# Patient Record
Sex: FEMALE
[De-identification: names, ages, dates, MRNs, and addresses within clinical notes are randomized per-mention and may not be internally consistent; named-entity substitution may affect disease eponyms.]

---
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
## 2020-07-17 ENCOUNTER — NON-APPOINTMENT (OUTPATIENT)
Age: 20
End: 2020-07-17

## 2020-07-17 ENCOUNTER — APPOINTMENT (OUTPATIENT)
Dept: OPHTHALMOLOGY | Facility: CLINIC | Age: 20
End: 2020-07-17
Payer: COMMERCIAL

## 2020-07-17 PROCEDURE — 92004 COMPRE OPH EXAM NEW PT 1/>: CPT

## 2020-07-17 PROCEDURE — 92015 DETERMINE REFRACTIVE STATE: CPT

## 2021-09-26 ENCOUNTER — TRANSCRIPTION ENCOUNTER (OUTPATIENT)
Age: 21
End: 2021-09-26

## 2023-12-12 ENCOUNTER — APPOINTMENT (OUTPATIENT)
Dept: ORTHOPEDIC SURGERY | Facility: CLINIC | Age: 23
End: 2023-12-12
Payer: COMMERCIAL

## 2023-12-12 VITALS — BODY MASS INDEX: 35.08 KG/M2 | HEIGHT: 63 IN | WEIGHT: 198 LBS

## 2023-12-12 DIAGNOSIS — Z78.9 OTHER SPECIFIED HEALTH STATUS: ICD-10-CM

## 2023-12-12 DIAGNOSIS — Z00.00 ENCOUNTER FOR GENERAL ADULT MEDICAL EXAMINATION W/OUT ABNORMAL FINDINGS: ICD-10-CM

## 2023-12-12 PROCEDURE — 73564 X-RAY EXAM KNEE 4 OR MORE: CPT | Mod: LT

## 2023-12-12 PROCEDURE — 99204 OFFICE O/P NEW MOD 45 MIN: CPT

## 2023-12-12 RX ORDER — DICLOFENAC SODIUM 1% 10 MG/G
1 GEL TOPICAL DAILY
Qty: 1 | Refills: 1 | Status: ACTIVE | COMMUNITY
Start: 2023-12-12 | End: 1900-01-01

## 2023-12-13 NOTE — DISCUSSION/SUMMARY
[de-identified] : 23f with left knee patellar tendinitis.  1) note to freeze gym membership 2) start physical therapy 3) cryotherapy, rest and activity modification 4) diclofenac gel rx 5) rtc 4 weeks   Entered by Yelena Castillo acting as scribe. Dr. Koroma- The documentation recorded by the scribe accurately reflects the service I personally performed and the decisions made by me.

## 2023-12-13 NOTE — HISTORY OF PRESENT ILLNESS
[de-identified] : 12/12/23: Ms Elinor Zuniga is a 22 y/o RHD F here c/o 2 weeks of non-traumatic left knee pain. She suspects an overuse related  to exercises and orange theory. Describes her pain as being located over the anterior aspect. Has been modifying her activity and exercise. Has tried icing and rest. No prior treatment.  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

## 2023-12-13 NOTE — PHYSICAL EXAM
[] : non-antalgic [de-identified] : pain with resisted SLR [TWNoteComboBox7] : flexion 125 degrees

## 2024-01-17 ENCOUNTER — APPOINTMENT (OUTPATIENT)
Dept: ORTHOPEDIC SURGERY | Facility: CLINIC | Age: 24
End: 2024-01-17
Payer: COMMERCIAL

## 2024-01-17 VITALS — WEIGHT: 198 LBS | BODY MASS INDEX: 35.08 KG/M2 | HEIGHT: 63 IN

## 2024-01-17 PROCEDURE — 99213 OFFICE O/P EST LOW 20 MIN: CPT

## 2024-01-17 NOTE — PHYSICAL EXAM
[Left] : left knee [NL (0)] : extension 0 degrees [4___] : quadriceps 4[unfilled]/5 [Negative] : negative Kay's [] : non-antalgic [FreeTextEntry8] : mild ttp  [de-identified] : pain with resisted SLR [TWNoteComboBox7] : flexion 125 degrees

## 2024-01-17 NOTE — HISTORY OF PRESENT ILLNESS
[de-identified] : 1/17/24: here to f/up left knee pain. Going to PT, seeing improvement in pain and strength. Still experiencing intermittent pain with prolonged walking and stairs. 12/12/23: Ms Elinor Zuniga is a 24 y/o RHD F here c/o 2 weeks of non-traumatic left knee pain. She suspects an overuse related  to exercises and orange theory. Describes her pain as being located over the anterior aspect. Has been modifying her activity and exercise. Has tried icing and rest. No prior treatment.  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

## 2024-01-17 NOTE — DISCUSSION/SUMMARY
[de-identified] : 23f with left knee patellar tendinitis.  1) note to freeze gym membership 2) c/w physical therapy 3) cryotherapy, rest and activity modification 4) diclofenac gel rx 5) rtc 6 weeks   Entered by Yelena Castillo acting as scribe. Dr. Koroma- The documentation recorded by the scribe accurately reflects the service I personally performed and the decisions made by me.

## 2024-02-28 ENCOUNTER — APPOINTMENT (OUTPATIENT)
Dept: ORTHOPEDIC SURGERY | Facility: CLINIC | Age: 24
End: 2024-02-28
Payer: COMMERCIAL

## 2024-02-28 DIAGNOSIS — M76.52 PATELLAR TENDINITIS, LEFT KNEE: ICD-10-CM

## 2024-02-28 PROCEDURE — 99213 OFFICE O/P EST LOW 20 MIN: CPT

## 2024-02-28 NOTE — HISTORY OF PRESENT ILLNESS
[de-identified] : 2/28/24: here to f/up left knee pain. Going to PT, seeing improvement in pain and strength. Experiencing some intermittent pain with stairs, increased activity.  1/17/24: here to f/up left knee pain. Going to PT, seeing improvement in pain and strength. Still experiencing intermittent pain with prolonged walking and stairs. 12/12/23: Ms Elinor Zuniga is a 24 y/o RHD F here c/o 2 weeks of non-traumatic left knee pain. She suspects an overuse related  to exercises and orange theory. Describes her pain as being located over the anterior aspect. Has been modifying her activity and exercise. Has tried icing and rest. No prior treatment.  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

## 2024-02-28 NOTE — DISCUSSION/SUMMARY
[de-identified] : 23f with left knee patellar tendinitis. Improved with PT.  1) cryotherapy, rest and activity modification 2) c/w diclofenac gel prn  3) hep  4) rtc prn   Entered by Yelena Castillo acting as scribe. Dr. Koroma- The documentation recorded by the scribe accurately reflects the service I personally performed and the decisions made by me.

## 2024-02-28 NOTE — PHYSICAL EXAM
[NL (0)] : extension 0 degrees [4___] : quadriceps 4[unfilled]/5 [Negative] : negative Kay's [Bilateral] : knee bilaterally [FreeTextEntry8] : mild ttp  [] : non-antalgic [TWNoteComboBox7] : flexion 125 degrees

## 2024-09-18 ENCOUNTER — OFFICE (OUTPATIENT)
Dept: URBAN - METROPOLITAN AREA CLINIC 70 | Facility: CLINIC | Age: 24
Setting detail: OPHTHALMOLOGY
End: 2024-09-18
Payer: COMMERCIAL

## 2024-09-18 DIAGNOSIS — H52.13: ICD-10-CM

## 2024-09-18 DIAGNOSIS — H50.15: ICD-10-CM

## 2024-09-18 PROCEDURE — 92004 COMPRE OPH EXAM NEW PT 1/>: CPT | Performed by: STUDENT IN AN ORGANIZED HEALTH CARE EDUCATION/TRAINING PROGRAM

## 2024-09-18 PROCEDURE — 92015 DETERMINE REFRACTIVE STATE: CPT | Performed by: STUDENT IN AN ORGANIZED HEALTH CARE EDUCATION/TRAINING PROGRAM

## 2024-09-18 ASSESSMENT — CONFRONTATIONAL VISUAL FIELD TEST (CVF)
OS_FINDINGS: FULL
OD_FINDINGS: FULL

## 2024-09-26 ENCOUNTER — RX ONLY (RX ONLY)
Age: 24
End: 2024-09-26

## 2025-04-04 ENCOUNTER — TRANSCRIPTION ENCOUNTER (OUTPATIENT)
Age: 25
End: 2025-04-04